# Patient Record
Sex: MALE | Race: WHITE
[De-identification: names, ages, dates, MRNs, and addresses within clinical notes are randomized per-mention and may not be internally consistent; named-entity substitution may affect disease eponyms.]

---

## 2018-01-17 ENCOUNTER — HOSPITAL ENCOUNTER (OUTPATIENT)
Dept: HOSPITAL 68 - STS | Age: 73
End: 2018-01-17
Attending: ORTHOPAEDIC SURGERY
Payer: COMMERCIAL

## 2018-01-17 VITALS — BODY MASS INDEX: 29.73 KG/M2 | HEIGHT: 66 IN | WEIGHT: 185 LBS

## 2018-01-17 DIAGNOSIS — M24.661: Primary | ICD-10-CM

## 2018-01-17 DIAGNOSIS — I25.2: ICD-10-CM

## 2018-01-17 DIAGNOSIS — I25.10: ICD-10-CM

## 2018-01-17 DIAGNOSIS — Z87.891: ICD-10-CM

## 2018-01-17 DIAGNOSIS — Z96.651: ICD-10-CM

## 2018-01-17 NOTE — OPERATIVE REPORT
Operative/Inv Procedure Report
Surgery Date: 01/17/18
Name of Procedure:
Manipulation under anesthesia a right total knee arthroplasty
Pre-Operative Diagnosis:
Arthrofibrosis right knee
Post-Operative Diagnosis:
Same
Estimated Blood Loss: none
Surgeon/Assistant:
Marino Leigh MD
 
Anesthesia: moderate sedation
IV Fluids:
See anesthesia record
Implants:
None
Drains:
None
Specimens:
None
Complications:
None
Condition:
Stable
Operative Indication:
Patient is a 72-year-old male status post right total knee arthroscopy 2 months 
ago whose been slow to progress with range of motion of his knee and was 
indicated for the patient under anesthesia.
 
Operative/Procedure Note
Note:
Once informed consent was obtained the correct was identified patient brought to
operative placed on table supine position.  After initiation of anesthesia 
manipulation of the right knee was done by flexing the knee slowly.  Is very 
evident that there was some Moo scarring and fibrosis.  I was able to get the 
knee to flex 130 without any problem at all in without any complication.  The 
the patient was awakened and taken recovery in stable condition.